# Patient Record
Sex: MALE | Race: WHITE
[De-identification: names, ages, dates, MRNs, and addresses within clinical notes are randomized per-mention and may not be internally consistent; named-entity substitution may affect disease eponyms.]

---

## 2019-08-26 ENCOUNTER — HOSPITAL ENCOUNTER (EMERGENCY)
Dept: HOSPITAL 26 - MED | Age: 24
Discharge: HOME | End: 2019-08-26
Payer: MEDICAID

## 2019-08-26 VITALS — SYSTOLIC BLOOD PRESSURE: 113 MMHG | DIASTOLIC BLOOD PRESSURE: 69 MMHG

## 2019-08-26 VITALS — WEIGHT: 176.38 LBS | BODY MASS INDEX: 30.11 KG/M2 | HEIGHT: 64 IN

## 2019-08-26 VITALS — SYSTOLIC BLOOD PRESSURE: 122 MMHG | DIASTOLIC BLOOD PRESSURE: 69 MMHG
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
## 2019-08-26 DIAGNOSIS — S29.012A: Primary | ICD-10-CM

## 2019-08-26 NOTE — NUR
PT C/O PAIN IN THE MIDDLE OF THE BACK SINCE SATURDAY. WAS LIFTING HEAVY 
FURNITURE OVER THE WEEKEND. WARM BATHES RELIEVE THE PAIN. PAIN IS 7/10. PT HAS 
NOT TAKEN ANY MEDS FOR PAIN. 



MEDHX: DENIES

RX: DENIES

## 2019-08-26 NOTE — NUR
Patient discharged with v/s stable. Written and verbal after care instructions 
given and explained. 

Patient alert, oriented and verbalized understanding of instructions. 
Ambulatory with steady gait. All questions addressed prior to discharge. ID 
band removed. Patient advised to follow up with PMD. Rx of IBURPROFEN given. 
Patient educated on indication of medication including possible reaction and 
side effects. Opportunity to ask questions provided and answered.